# Patient Record
Sex: MALE | URBAN - METROPOLITAN AREA
[De-identification: names, ages, dates, MRNs, and addresses within clinical notes are randomized per-mention and may not be internally consistent; named-entity substitution may affect disease eponyms.]

---

## 2024-04-18 ENCOUNTER — APPOINTMENT (RX ONLY)
Dept: URBAN - METROPOLITAN AREA CLINIC 10 | Facility: CLINIC | Age: 84
Setting detail: DERMATOLOGY
End: 2024-04-18

## 2024-04-18 DIAGNOSIS — D18.0 HEMANGIOMA: ICD-10-CM

## 2024-04-18 DIAGNOSIS — L81.4 OTHER MELANIN HYPERPIGMENTATION: ICD-10-CM

## 2024-04-18 DIAGNOSIS — D22 MELANOCYTIC NEVI: ICD-10-CM

## 2024-04-18 DIAGNOSIS — B35.1 TINEA UNGUIUM: ICD-10-CM

## 2024-04-18 DIAGNOSIS — Z71.89 OTHER SPECIFIED COUNSELING: ICD-10-CM

## 2024-04-18 DIAGNOSIS — L82.1 OTHER SEBORRHEIC KERATOSIS: ICD-10-CM

## 2024-04-18 DIAGNOSIS — L57.0 ACTINIC KERATOSIS: ICD-10-CM

## 2024-04-18 PROBLEM — D18.01 HEMANGIOMA OF SKIN AND SUBCUTANEOUS TISSUE: Status: ACTIVE | Noted: 2024-04-18

## 2024-04-18 PROBLEM — D22.9 MELANOCYTIC NEVI, UNSPECIFIED: Status: ACTIVE | Noted: 2024-04-18

## 2024-04-18 PROCEDURE — 17000 DESTRUCT PREMALG LESION: CPT

## 2024-04-18 PROCEDURE — ? LIQUID NITROGEN

## 2024-04-18 PROCEDURE — ? DEFER

## 2024-04-18 PROCEDURE — ? COUNSELING

## 2024-04-18 PROCEDURE — 17003 DESTRUCT PREMALG LES 2-14: CPT

## 2024-04-18 PROCEDURE — 99203 OFFICE O/P NEW LOW 30 MIN: CPT | Mod: 25

## 2024-04-18 ASSESSMENT — LOCATION DETAILED DESCRIPTION DERM
LOCATION DETAILED: HAIR
LOCATION DETAILED: LEFT 2ND TOENAIL
LOCATION DETAILED: RIGHT 3RD TOENAIL
LOCATION DETAILED: LEFT GREAT TOENAIL
LOCATION DETAILED: RIGHT 4TH TOENAIL
LOCATION DETAILED: RIGHT 2ND TOENAIL
LOCATION DETAILED: EPIGASTRIC SKIN
LOCATION DETAILED: RIGHT MEDIAL FRONTAL SCALP
LOCATION DETAILED: LEFT CENTRAL MALAR CHEEK
LOCATION DETAILED: LEFT DISTAL PLANTAR 3RD TOE
LOCATION DETAILED: RIGHT INFERIOR FOREHEAD
LOCATION DETAILED: RIGHT INFERIOR UPPER BACK
LOCATION DETAILED: RIGHT SUPERIOR PARIETAL SCALP
LOCATION DETAILED: RIGHT 5TH TOENAIL
LOCATION DETAILED: RIGHT GREAT TOENAIL
LOCATION DETAILED: LEFT 5TH TOENAIL

## 2024-04-18 ASSESSMENT — LOCATION ZONE DERM
LOCATION ZONE: TRUNK
LOCATION ZONE: TOENAIL
LOCATION ZONE: SCALP
LOCATION ZONE: TOE
LOCATION ZONE: FACE

## 2024-04-18 ASSESSMENT — LOCATION SIMPLE DESCRIPTION DERM
LOCATION SIMPLE: RIGHT UPPER BACK
LOCATION SIMPLE: RIGHT 2ND TOE
LOCATION SIMPLE: RIGHT FOREHEAD
LOCATION SIMPLE: LEFT 5TH TOE
LOCATION SIMPLE: LEFT GREAT TOE
LOCATION SIMPLE: RIGHT SCALP
LOCATION SIMPLE: RIGHT GREAT TOE
LOCATION SIMPLE: RIGHT 5TH TOE
LOCATION SIMPLE: RIGHT 3RD TOE
LOCATION SIMPLE: SCALP
LOCATION SIMPLE: RIGHT 4TH TOE
LOCATION SIMPLE: ABDOMEN
LOCATION SIMPLE: HAIR
LOCATION SIMPLE: LEFT CHEEK
LOCATION SIMPLE: LEFT 2ND TOE
LOCATION SIMPLE: LEFT 3RD TOE

## 2024-04-18 NOTE — PROCEDURE: LIQUID NITROGEN
Consent: The patient's consent was obtained including but not limited to risks of crusting, scabbing, blistering, scarring, darker or lighter pigmentary change, recurrence, incomplete removal and infection.
Show Aperture Variable?: Yes
Post-Care Instructions: I reviewed with the patient in detail post-care instructions. Patient is to wear sunprotection, and avoid picking at any of the treated lesions. Pt may apply Vaseline to crusted or scabbing areas.
Duration Of Freeze Thaw-Cycle (Seconds): 5
Number Of Freeze-Thaw Cycles: 2 freeze-thaw cycles
Render Note In Bullet Format When Appropriate: No
Detail Level: Detailed